# Patient Record
Sex: MALE | Race: WHITE
[De-identification: names, ages, dates, MRNs, and addresses within clinical notes are randomized per-mention and may not be internally consistent; named-entity substitution may affect disease eponyms.]

---

## 2018-03-16 ENCOUNTER — HOSPITAL ENCOUNTER (OUTPATIENT)
Dept: HOSPITAL 45 - C.ULTR | Age: 73
Discharge: HOME | End: 2018-03-16
Attending: INTERNAL MEDICINE
Payer: MEDICARE

## 2018-03-16 DIAGNOSIS — R18.8: ICD-10-CM

## 2018-03-16 DIAGNOSIS — K74.60: Primary | ICD-10-CM

## 2018-03-16 DIAGNOSIS — Z90.49: ICD-10-CM

## 2018-03-16 NOTE — DIAGNOSTIC IMAGING REPORT
ULTRASOUND RIGHT UPPER QUADRANT ABDOMEN



CLINICAL HISTORY: Hepatic cirrhosis.



COMPARISON STUDY: No priors.



TECHNIQUE: Real-time, grayscale, and color flow sonography of the right upper

quadrant of the abdomen was performed. Images are reviewed in the transverse and

longitudinal planes.



FINDINGS:



Liver: The liver is cirrhotic in morphology and heterogeneous in echotexture.

There is nodularity of the hepatic surface contour. There is no sonographic

evidence of hepatic mass lesion. There is no intrahepatic biliary ductal

dilatation. The main portal vein is patent.



Gallbladder: The gallbladder is surgically absent. The common bile duct measures

up to 0.5 cm in diameter.



Pancreas: Visualized portions of the pancreatic head and body are normal in

appearance. The splenic vein is patent.



Right kidney: Survey images of the right kidney demonstrate mild cortical

atrophy. There is no hydronephrosis. Cortical scarring is suggested in the lower

pole.



Ascites: Trace perihepatic ascites is identified.





IMPRESSION: 



1. Cirrhotic liver morphology.



2. The gallbladder is surgically absent.



3. Trace perihepatic ascites is noted.







Electronically signed by:  West Garcia M.D.

3/16/2018 9:27 AM



Dictated Date/Time:  3/16/2018 9:19 AM